# Patient Record
Sex: FEMALE | Race: WHITE | NOT HISPANIC OR LATINO | Employment: FULL TIME | URBAN - METROPOLITAN AREA
[De-identification: names, ages, dates, MRNs, and addresses within clinical notes are randomized per-mention and may not be internally consistent; named-entity substitution may affect disease eponyms.]

---

## 2018-09-18 ENCOUNTER — OFFICE VISIT (OUTPATIENT)
Dept: GASTROENTEROLOGY | Facility: CLINIC | Age: 48
End: 2018-09-18

## 2018-09-18 VITALS
DIASTOLIC BLOOD PRESSURE: 78 MMHG | SYSTOLIC BLOOD PRESSURE: 112 MMHG | WEIGHT: 155 LBS | RESPIRATION RATE: 14 BRPM | HEART RATE: 86 BPM | HEIGHT: 64 IN | TEMPERATURE: 98.7 F | BODY MASS INDEX: 26.46 KG/M2 | OXYGEN SATURATION: 99 %

## 2018-09-18 DIAGNOSIS — R10.13 EPIGASTRIC PAIN: Primary | ICD-10-CM

## 2018-09-18 PROCEDURE — 99213 OFFICE O/P EST LOW 20 MIN: CPT | Performed by: INTERNAL MEDICINE

## 2018-09-18 RX ORDER — BACLOFEN 10 MG/1
TABLET ORAL
COMMUNITY
Start: 2018-01-26

## 2018-09-18 RX ORDER — TAMOXIFEN CITRATE 20 MG/1
TABLET ORAL DAILY
COMMUNITY

## 2018-09-18 RX ORDER — TRAZODONE HYDROCHLORIDE 100 MG/1
100 TABLET ORAL NIGHTLY
COMMUNITY

## 2018-09-18 RX ORDER — LORATADINE 10 MG/1
TABLET ORAL
COMMUNITY

## 2018-09-18 NOTE — PROGRESS NOTES
Chief Complaint:  He is an old patient of mine.  She had problems with reflux and also had gastritis.  She was Helicobacter positive.  She's had several epigastric pain.  Not taking anything specifically for this.  She was also recently diagnosed breast cancer.      HPI over the last 6-8 months Elba is had recurrent epigastric pain.  She was in the midst of her diagnosis bilateral mastectomy and reconstructive surgery when all of this started.  She contributed some of this distress.  She continues to have epigastric burning.  As stated above she's had reflux in the past as well as gastritis.  She was also advised to have a screening colonoscopy.  For these reasons she is here today.    Past Medical History:   Past Medical History:   Diagnosis Date   • Cancer (CMS/HCC)     Breast   • Microscopic colitis        Family History:  History reviewed. No pertinent family history.    Social History:   reports that she has never smoked. She has never used smokeless tobacco. She reports that she drinks about 3.0 oz of alcohol per week . She reports that she does not use drugs.    Medications:     Current Outpatient Prescriptions:   •  baclofen (LIORESAL) 10 MG tablet, baclofen 10 mg tablet  Three times a day, Disp: , Rfl:   •  loratadine (CLARITIN) 10 MG tablet, Claritin 10 mg tablet  Daily, Disp: , Rfl:   •  tamoxifen (NOLVADEX) 20 MG chemo tablet, Take  by mouth Daily., Disp: , Rfl:   •  traZODone (DESYREL) 100 MG tablet, Take 100 mg by mouth Every Night., Disp: , Rfl:     Allergies:  Patient has no known allergies.    Review of Systems   Constitutional: Positive for fatigue, fever and unexpected weight change.   Gastrointestinal: Positive for abdominal distention, abdominal pain (RUQ), blood in stool, constipation and nausea.        Hiatal Hernia   All other systems reviewed and are negative.            Physical Exam:   Constitutional: Pt is oriented to person, place, and time and well-developed, well-nourished, and in  no distress.   HENT: Mouth/Throat: Oropharynx is clear and moist.     Cardiovascular: Normal rate, regular rhythm and normal heart sounds.    Pulmonary/Chest: Effort normal and breath sounds normal. No respiratory distress. No  wheezes.   Abdominal: Soft. Bowel sounds are normal.             Assessment and Plan Elba is here because of epigastric pain and to obtain a screening colonoscopy.  Arrangements be made for her to undergo an outpatient endoscopy and colonoscopy on the same day.     Nathan Means M.D.  [facility]  [phone]  Gastroenterology  EMR Dragon/Transcription disclaimer:   Much of this encounter note is an electronic transcription/translation of spoken language to printed text. The electronic translation of spoken language may permit erroneous, or at times, nonsensical words or phrases to be inadvertently transcribed; Although I have reviewed the note for such errors, some may still exist.

## 2018-10-10 DIAGNOSIS — Z12.11 SCREENING FOR COLON CANCER: Primary | ICD-10-CM

## 2018-10-15 ENCOUNTER — OUTSIDE FACILITY SERVICE (OUTPATIENT)
Dept: GASTROENTEROLOGY | Facility: CLINIC | Age: 48
End: 2018-10-15

## 2018-10-15 ENCOUNTER — LAB REQUISITION (OUTPATIENT)
Dept: LAB | Facility: HOSPITAL | Age: 48
End: 2018-10-15

## 2018-10-15 DIAGNOSIS — R10.84 GENERALIZED ABDOMINAL PAIN: ICD-10-CM

## 2018-10-15 PROCEDURE — 45385 COLONOSCOPY W/LESION REMOVAL: CPT | Performed by: INTERNAL MEDICINE

## 2018-10-15 PROCEDURE — G0500 MOD SEDAT ENDO SERVICE >5YRS: HCPCS | Performed by: INTERNAL MEDICINE

## 2018-10-15 PROCEDURE — 45388 COLONOSCOPY W/ABLATION: CPT | Performed by: INTERNAL MEDICINE

## 2018-10-15 PROCEDURE — 88305 TISSUE EXAM BY PATHOLOGIST: CPT | Performed by: INTERNAL MEDICINE

## 2018-10-15 PROCEDURE — 43239 EGD BIOPSY SINGLE/MULTIPLE: CPT | Performed by: INTERNAL MEDICINE

## 2018-10-16 LAB
CYTO UR: NORMAL
LAB AP CASE REPORT: NORMAL
LAB AP CLINICAL INFORMATION: NORMAL
PATH REPORT.FINAL DX SPEC: NORMAL
PATH REPORT.GROSS SPEC: NORMAL

## 2021-09-20 ENCOUNTER — TRANSCRIBE ORDERS (OUTPATIENT)
Dept: ADMINISTRATIVE | Facility: HOSPITAL | Age: 51
End: 2021-09-20

## 2021-09-20 DIAGNOSIS — R10.2 SUPRAPUBIC PAIN: Primary | ICD-10-CM

## 2021-09-22 ENCOUNTER — TRANSCRIBE ORDERS (OUTPATIENT)
Dept: ADMINISTRATIVE | Facility: HOSPITAL | Age: 51
End: 2021-09-22

## 2021-09-22 ENCOUNTER — HOSPITAL ENCOUNTER (OUTPATIENT)
Dept: GENERAL RADIOLOGY | Facility: HOSPITAL | Age: 51
Discharge: HOME | End: 2021-09-22
Admitting: INTERNAL MEDICINE

## 2021-09-22 DIAGNOSIS — R10.2 SUPRAPUBIC PAIN: Primary | ICD-10-CM

## 2021-09-22 DIAGNOSIS — R10.2 SUPRAPUBIC PAIN: ICD-10-CM

## 2021-09-22 PROCEDURE — 74018 RADEX ABDOMEN 1 VIEW: CPT

## 2022-06-07 ENCOUNTER — OFFICE VISIT (OUTPATIENT)
Dept: GASTROENTEROLOGY | Facility: CLINIC | Age: 52
End: 2022-06-07

## 2022-06-07 VITALS
TEMPERATURE: 97.4 F | SYSTOLIC BLOOD PRESSURE: 118 MMHG | DIASTOLIC BLOOD PRESSURE: 64 MMHG | WEIGHT: 152.2 LBS | HEIGHT: 64 IN | HEART RATE: 86 BPM | BODY MASS INDEX: 25.99 KG/M2 | OXYGEN SATURATION: 100 %

## 2022-06-07 DIAGNOSIS — M62.89 PELVIC FLOOR DYSFUNCTION: ICD-10-CM

## 2022-06-07 DIAGNOSIS — K59.09 CHRONIC CONSTIPATION: Primary | ICD-10-CM

## 2022-06-07 PROBLEM — M54.9 CHRONIC NECK AND BACK PAIN: Status: ACTIVE | Noted: 2020-12-03

## 2022-06-07 PROBLEM — G96.810 INTRACRANIAL HYPOTENSION: Status: ACTIVE | Noted: 2020-08-02

## 2022-06-07 PROBLEM — C50.211 MALIGNANT NEOPLASM OF UPPER-INNER QUADRANT OF RIGHT BREAST IN FEMALE, ESTROGEN RECEPTOR POSITIVE (HCC): Status: ACTIVE | Noted: 2021-07-15

## 2022-06-07 PROBLEM — G89.29 CHRONIC NECK AND BACK PAIN: Status: ACTIVE | Noted: 2020-12-03

## 2022-06-07 PROBLEM — M70.70 BURSITIS OF HIP: Status: ACTIVE | Noted: 2020-10-29

## 2022-06-07 PROBLEM — Z91.89 AT RISK FOR BLEEDING: Status: ACTIVE | Noted: 2020-08-10

## 2022-06-07 PROBLEM — M25.50 ARTHRALGIA: Status: ACTIVE | Noted: 2020-10-29

## 2022-06-07 PROBLEM — M50.30 DDD (DEGENERATIVE DISC DISEASE), CERVICAL: Status: ACTIVE | Noted: 2020-12-03

## 2022-06-07 PROBLEM — Z17.0 MALIGNANT NEOPLASM OF UPPER-INNER QUADRANT OF RIGHT BREAST IN FEMALE, ESTROGEN RECEPTOR POSITIVE: Status: ACTIVE | Noted: 2021-07-15

## 2022-06-07 PROBLEM — M54.2 CHRONIC NECK AND BACK PAIN: Status: ACTIVE | Noted: 2020-12-03

## 2022-06-07 PROCEDURE — 99204 OFFICE O/P NEW MOD 45 MIN: CPT | Performed by: PHYSICIAN ASSISTANT

## 2022-06-07 RX ORDER — DEXAMETHASONE SODIUM PHOSPHATE 10 MG/ML
INJECTION INTRAMUSCULAR; INTRAVENOUS
COMMUNITY

## 2022-06-07 RX ORDER — LORATADINE 10 MG/1
TABLET ORAL
COMMUNITY

## 2022-06-07 RX ORDER — VENLAFAXINE HYDROCHLORIDE 150 MG/1
CAPSULE, EXTENDED RELEASE ORAL
COMMUNITY
Start: 2022-05-27

## 2022-06-07 RX ORDER — FLUTICASONE PROPIONATE 50 MCG
SPRAY, SUSPENSION (ML) NASAL EVERY 12 HOURS SCHEDULED
COMMUNITY

## 2022-06-07 RX ORDER — ESTRADIOL 2 MG/1
2 RING VAGINAL
COMMUNITY
Start: 2021-08-18

## 2022-06-07 RX ORDER — FLUTICASONE PROPIONATE 50 MCG
SPRAY, SUSPENSION (ML) NASAL EVERY 12 HOURS
COMMUNITY

## 2022-06-07 RX ORDER — ANASTROZOLE 1 MG/1
TABLET ORAL
COMMUNITY
Start: 2022-06-06

## 2022-06-07 RX ORDER — IBUPROFEN 200 MG
TABLET ORAL
COMMUNITY

## 2022-06-07 RX ORDER — ESTRADIOL 10 UG/1
INSERT VAGINAL
COMMUNITY
Start: 2021-08-24

## 2022-06-07 RX ORDER — ANASTROZOLE 1 MG/1
1 TABLET ORAL DAILY
COMMUNITY
Start: 2022-06-06 | End: 2023-06-06

## 2022-06-07 NOTE — PROGRESS NOTES
New Patient Consultation     Patient Name: Elba Brizuela  : 1970   MRN: [id number]     Chief Complaint:    Chief Complaint   Patient presents with   • Constipation       History of Present Illness: Elba Brizuela is a 51 y.o. female, PMH includes microscopic colitis, breast cancer, arthritis, who is here today for a Gastroenterology Consultation for chronic constipation.    Pt reports chronic constipation, dating back to the birth of her child 18 years ago. She notes one year h/o worsened sx, including 12-14 days between BMs, suprapubic pain and vaginal fullness. She was seen by urology and dx with a rectocele, and completed pelvic floor therapy which she does note improved her sx. She questions if she might have scar tissue involvement from remote h/o CRISTINA.    She reports large, formed, complete BM about once every 14 days. She denies excessive straining, rectal fullness or pain or changes in position to evacuate. She denies stool color changes or blood in stool. She has not tolerated lactulose due to bloating, and not tolerated linzess due to cramping. She uses miralax occasionally with good relief of sx. She takes benefiber and probiotic daily.     Patient denies associated fever, chills, indigestion, nausea, vomiting, diarrhea, hematemesis, dysphagia, hematochezia, melena, weight loss or gain, dysuria, jaundice or bruising.    Patient denies personal or FHx of PUD, gastritis, pancreatitis, Celiac disease, UC, Crohn's disease, IBS, colon or gastric cancers. Pt denies tobacco, illicit substance or NSAID use. Few cans of beer per week.     KUB 2021: Moderate fecal loading of colon, nonobstructive bowel gas pattern    CSY 2018 with Dr. Means. Excellent colon prep conditions. Diverticulosis of sigmoid colon. Rectal polyp, resected and retrieved. Sigmoid colon polyp, resected and retrieved. Recommend repeat CSY in 5 years.     Subjective      Review of Systems:   Review of Systems    Constitutional: Negative for appetite change, chills, diaphoresis, fatigue, fever, unexpected weight gain and unexpected weight loss.   HENT: Negative for drooling, facial swelling, mouth sores, rhinorrhea, sore throat, tinnitus, trouble swallowing and voice change.    Eyes: Negative.    Respiratory: Negative for cough, chest tightness and shortness of breath.    Cardiovascular: Negative for chest pain.   Gastrointestinal: Positive for constipation. Negative for abdominal pain, blood in stool, diarrhea, nausea, vomiting, GERD and indigestion.   Genitourinary: Negative for dysuria, flank pain, hematuria and pelvic pain.   Musculoskeletal: Negative for arthralgias and myalgias.   Skin: Negative for color change, pallor and rash.   Neurological: Negative for dizziness, tremors, syncope, weakness and numbness.   Psychiatric/Behavioral: Negative for hallucinations and sleep disturbance. The patient is not nervous/anxious.    All other systems reviewed and are negative.      Past Medical History:   Past Medical History:   Diagnosis Date   • Cancer (CMS/HCC)     Breast   • Microscopic colitis        Past Surgical History:   Past Surgical History:   Procedure Laterality Date   • MASTECTOMY Bilateral        Family History: No family history on file.    Social History:   Social History     Socioeconomic History   • Marital status:    Tobacco Use   • Smoking status: Never Smoker   • Smokeless tobacco: Never Used   Substance and Sexual Activity   • Alcohol use: Yes     Alcohol/week: 5.0 standard drinks     Types: 5 Cans of beer per week     Comment: 5 days week   • Drug use: No   • Sexual activity: Defer       Alcohol/Tobacco History:   Social History     Substance and Sexual Activity   Alcohol Use Yes   • Alcohol/week: 5.0 standard drinks   • Types: 5 Cans of beer per week    Comment: 5 days week     Social History     Tobacco Use   Smoking Status Never Smoker   Smokeless Tobacco Never Used       Medications:      Current Outpatient Medications:   •  anastrozole (ARIMIDEX) 1 MG tablet, Take 1 mg by mouth Daily., Disp: , Rfl:   •  estradiol (Estring) 2 MG vaginal ring, Insert 2 mg into the vagina., Disp: , Rfl:   •  estradiol (VAGIFEM) 10 MCG tablet vaginal tablet, Insert 1 tablet intravaginally daily for 2 weeks.  After 14 days, insert one tablet intravaginally twice a week., Disp: , Rfl:   •  anastrozole (ARIMIDEX) 1 MG tablet, , Disp: , Rfl:   •  baclofen (LIORESAL) 10 MG tablet, baclofen 10 mg tablet  Three times a day, Disp: , Rfl:   •  dexamethasone (DECADRON) 10 MG/ML injection, dexamethasone sodium phosphate 10 mg/mL injection solution  10mg IM injection once, Disp: , Rfl:   •  fluticasone (FLONASE) 50 MCG/ACT nasal spray, Every 12 (Twelve) Hours., Disp: , Rfl:   •  fluticasone (FLONASE) 50 MCG/ACT nasal spray, Every 12 (Twelve) Hours., Disp: , Rfl:   •  ibuprofen (ADVIL,MOTRIN) 200 MG tablet, 1 tab(s) orally every 6 hours, As Needed, Disp: , Rfl:   •  loratadine (CLARITIN) 10 MG tablet, Claritin 10 mg tablet  Daily, Disp: , Rfl:   •  loratadine (Claritin) 10 MG tablet, Claritin 10 mg tablet  Daily, Disp: , Rfl:   •  Sod Picosulfate-Mag Ox-Cit Acd 10-3.5-12 MG-GM -GM/160ML solution, Take 1 kit by mouth Take As Directed. Follow instructions that were mailed to your home. If you didn't receive these call [phone]., Disp: 2 bottle, Rfl: 0  •  tamoxifen (NOLVADEX) 20 MG chemo tablet, Take  by mouth Daily., Disp: , Rfl:   •  traZODone (DESYREL) 100 MG tablet, Take 100 mg by mouth Every Night., Disp: , Rfl:   •  venlafaxine XR (EFFEXOR-XR) 150 MG 24 hr capsule, TAKE 1 CAPSULE BY MOUTH DAILY, PT NEEDS APPOINTMENT FOR FURTHER REFILLS. 4/25/2022, Disp: , Rfl:     Allergies:   No Known Allergies    Objective     Physical Exam:  Vital Signs: There were no vitals filed for this visit.  There is no height or weight on file to calculate BMI.     Physical Exam  Vitals and nursing note reviewed.   Constitutional:        Appearance: Normal appearance. She is normal weight. She is not ill-appearing or diaphoretic.   HENT:      Head: Normocephalic and atraumatic.      Right Ear: External ear normal.      Left Ear: External ear normal.      Nose: Nose normal. No rhinorrhea.      Mouth/Throat:      Mouth: Mucous membranes are moist.      Pharynx: Oropharynx is clear. No posterior oropharyngeal erythema.   Eyes:      General:         Right eye: No discharge.         Left eye: No discharge.      Conjunctiva/sclera: Conjunctivae normal.      Pupils: Pupils are equal, round, and reactive to light.   Neck:      Thyroid: No thyromegaly.   Cardiovascular:      Rate and Rhythm: Normal rate and regular rhythm.      Pulses: Normal pulses.      Heart sounds: Normal heart sounds. No murmur heard.  Pulmonary:      Effort: Pulmonary effort is normal.      Breath sounds: Normal breath sounds. No wheezing.   Abdominal:      General: Abdomen is flat. Bowel sounds are normal. There is no distension.      Tenderness: There is no abdominal tenderness.      Comments: Large stool burden through ascending and transverse colon   Musculoskeletal:         General: No tenderness. Normal range of motion.      Cervical back: Normal range of motion and neck supple.      Right lower leg: No edema.      Left lower leg: No edema.   Skin:     General: Skin is warm and dry.      Capillary Refill: Capillary refill takes less than 2 seconds.      Coloration: Skin is not jaundiced.      Findings: No bruising.   Neurological:      General: No focal deficit present.      Mental Status: She is oriented to person, place, and time.      Cranial Nerves: No cranial nerve deficit.   Psychiatric:         Mood and Affect: Mood normal.         Thought Content: Thought content normal.         Judgment: Judgment normal.         Assessment / Plan      Assessment/Plan:   There are no diagnoses linked to this encounter.     Chronic constipation  Pelvic floor dysfunction, s/p PT   -  continue benefiber, probiotic daily   - encouraged to take miralax 1 capful daily   - pt afforded samples of trulance x 1mo   - previous imaging, endoscopy and pathology reports reviewed   - follow up in clinic in 1mo, or after completion of above studies   - call clinic at any time for questions or new / worsened sx    Follow Up:   Return in about 4 weeks (around 7/5/2022).    Plan of care reviewed with the patient at the conclusion of today's visit.  Education was provided regarding diagnosis, management, and any prescribed or recommended OTC medications.  Patient verbalized understanding of and agreement with management plan.     Time Statement:   Discussed plan of care in detail with patient today. Patient verbally understands and agrees. I have spent 45 minutes reviewing available diagnostics, obtaining history, examining the patient, developing a treatment plan, and educating the patient on disease process and plan of care.    Maryam Huston PA-C   [facility]

## 2022-07-07 ENCOUNTER — TELEPHONE (OUTPATIENT)
Dept: GASTROENTEROLOGY | Facility: CLINIC | Age: 52
End: 2022-07-07

## 2022-07-07 DIAGNOSIS — K59.04 CHRONIC IDIOPATHIC CONSTIPATION: Primary | ICD-10-CM

## 2022-07-07 NOTE — TELEPHONE ENCOUNTER
Maryam    Ms Brizuela left message yesterday afternoon and cancel her appointment with you today. Patient stated she will need to reschedule. She is also needing Trulance prescription to be sent to her pharmacy. Thanks

## 2023-10-19 RX ORDER — SODIUM PICOSULFATE, MAGNESIUM OXIDE, AND ANHYDROUS CITRIC ACID 12; 3.5; 1 G/175ML; G/175ML; MG/175ML
350 LIQUID ORAL ONCE
Qty: 350 ML | Refills: 0 | Status: SHIPPED | OUTPATIENT
Start: 2023-10-19 | End: 2023-10-19

## 2023-10-26 ENCOUNTER — OUTSIDE FACILITY SERVICE (OUTPATIENT)
Dept: GASTROENTEROLOGY | Facility: CLINIC | Age: 53
End: 2023-10-26
Payer: COMMERCIAL

## 2023-10-26 PROCEDURE — 45388 COLONOSCOPY W/ABLATION: CPT | Performed by: INTERNAL MEDICINE

## 2023-10-26 PROCEDURE — 45385 COLONOSCOPY W/LESION REMOVAL: CPT | Performed by: INTERNAL MEDICINE

## 2023-10-26 PROCEDURE — 88305 TISSUE EXAM BY PATHOLOGIST: CPT

## 2023-10-27 ENCOUNTER — LAB REQUISITION (OUTPATIENT)
Dept: LAB | Facility: HOSPITAL | Age: 53
End: 2023-10-27
Payer: COMMERCIAL

## 2023-10-27 DIAGNOSIS — Z12.11 ENCOUNTER FOR SCREENING FOR MALIGNANT NEOPLASM OF COLON: ICD-10-CM

## 2023-10-31 ENCOUNTER — TELEPHONE (OUTPATIENT)
Dept: GASTROENTEROLOGY | Facility: CLINIC | Age: 53
End: 2023-10-31
Payer: COMMERCIAL

## 2023-10-31 LAB — REF LAB TEST METHOD: NORMAL

## 2023-10-31 NOTE — TELEPHONE ENCOUNTER
----- Message from Nathan Means MD sent at 10/31/2023 10:32 AM EDT -----  Please let Elba know that she had an adenoma.  Follow-up in 5 years time is recommended

## 2023-10-31 NOTE — TELEPHONE ENCOUNTER
I TRIED TO CALL [facility] TO GIVE PATHOLOGY RESULTS. NO ANSWER; LEFT VOICE MESSAGE. I WILL MAIL RESULT LETTER.

## 2023-10-31 NOTE — TELEPHONE ENCOUNTER
Hub staff attempted to follow warm transfer process and was unsuccessful     Caller: Elba Brizuela    Relationship to patient: Self    Best call back number: [phone]    Patient is needing: PT RETURNING PHONE CALL TO [facility], PLEASE CALL BACK AND ADVISE.

## 2025-02-27 ENCOUNTER — OUTSIDE FACILITY SERVICE (OUTPATIENT)
Dept: GASTROENTEROLOGY | Facility: CLINIC | Age: 55
End: 2025-02-27
Payer: COMMERCIAL

## 2025-02-27 PROCEDURE — 43239 EGD BIOPSY SINGLE/MULTIPLE: CPT | Performed by: INTERNAL MEDICINE

## 2025-02-27 PROCEDURE — 88305 TISSUE EXAM BY PATHOLOGIST: CPT | Performed by: INTERNAL MEDICINE

## 2025-02-28 ENCOUNTER — LAB REQUISITION (OUTPATIENT)
Dept: LAB | Facility: HOSPITAL | Age: 55
End: 2025-02-28
Payer: COMMERCIAL

## 2025-02-28 DIAGNOSIS — K44.9 DIAPHRAGMATIC HERNIA WITHOUT OBSTRUCTION OR GANGRENE: ICD-10-CM
